# Patient Record
Sex: FEMALE | Race: BLACK OR AFRICAN AMERICAN | NOT HISPANIC OR LATINO | URBAN - NONMETROPOLITAN AREA
[De-identification: names, ages, dates, MRNs, and addresses within clinical notes are randomized per-mention and may not be internally consistent; named-entity substitution may affect disease eponyms.]

---

## 2019-02-20 ENCOUNTER — IMPORTED ENCOUNTER (OUTPATIENT)
Dept: URBAN - NONMETROPOLITAN AREA CLINIC 1 | Facility: CLINIC | Age: 39
End: 2019-02-20

## 2019-02-20 PROBLEM — H52.13: Noted: 2019-02-20

## 2019-02-20 PROBLEM — H35.373: Noted: 2019-02-20

## 2019-02-20 PROBLEM — H25.13: Noted: 2019-02-20

## 2019-02-20 PROBLEM — H52.223: Noted: 2019-02-20

## 2019-02-20 PROBLEM — E11.3293: Noted: 2018-02-13

## 2019-02-20 PROCEDURE — 92014 COMPRE OPH EXAM EST PT 1/>: CPT

## 2019-02-20 PROCEDURE — 92250 FUNDUS PHOTOGRAPHY W/I&R: CPT

## 2019-02-20 NOTE — PATIENT DISCUSSION
Type II DM dx 2013-  Discussed diagnosis in detail w/ pt today-  Exudates noted near macula OS. -  Optos done today dot blot hemes and MAs. -  OCT done previously no edema noted. -  Patient states A1C is much better at this time. -  Patient also states her kidney levels are not good either. -  Hx of kidney transplant noted. -  Monitor very closely. ERM OU-  Discussed findings in detail w/ pt today-  Signs/symptoms associated discussed-  OCT done today shows trace findings as noted. -  Monitor closely for changes. Myopia/astig-  Discussed diagnosis in detail w/ pt today-  New GLRx will be given today better than previous. -  Monitor yearly or PRN Trace Cataracts OU-  Discussed findings in detail w/ pt today-  Stable will monitor for progression.; Dr's Notes: **Pt is very impatient does not like to wait. Always speak kindly she gets her feelings hurt very easily - she told us herself! **MR  6/14/16 - n/c due to high BSDFE  2/13/18Optos 2/20/19OCT mac

## 2019-08-20 ENCOUNTER — IMPORTED ENCOUNTER (OUTPATIENT)
Dept: URBAN - NONMETROPOLITAN AREA CLINIC 1 | Facility: CLINIC | Age: 39
End: 2019-08-20

## 2019-08-20 PROBLEM — E11.3293: Noted: 2019-08-20

## 2019-08-20 PROBLEM — H52.13: Noted: 2019-08-20

## 2019-08-20 PROBLEM — H35.373: Noted: 2019-08-20

## 2019-08-20 PROBLEM — H25.13: Noted: 2019-08-20

## 2019-08-20 PROBLEM — H52.223: Noted: 2019-08-20

## 2019-08-20 PROCEDURE — S0621 ROUTINE OPHTHALMOLOGICAL EXA: HCPCS

## 2019-08-20 NOTE — PATIENT DISCUSSION
Myopia/astig-  Discussed diagnosis in detail w/ pt today-  New GLRx will be given today MR done by Angelito Cardoza. -  Monitor yearly or PRN Type II DM dx 2013-  Discussed diagnosis in detail w/ pt today-  Exudates noted near macula OS. -  Optos done previously dot blot hemes and MAs. -  OCT done previously no edema noted. -  Patient states A1C is much better at this time. -  Patient also states her kidney levels are not good either. -  Hx of kidney transplant noted. -  Monitor very closely. ERM OU-  Discussed findings in detail w/ pt today-  Signs/symptoms associated discussed-  OCT done previously shows trace findings as noted. -  Monitor closely for changes. Trace Cataracts OU-  Discussed findings in detail w/ pt today-  Stable will monitor for progression.; Dr's Notes: **Pt is very impatient does not like to wait. Always speak kindly she gets her feelings hurt very easily - she told us herself! **MR  8/20/19 DFE  2/13/18Optos 2/20/19OCT mac

## 2020-02-26 ENCOUNTER — IMPORTED ENCOUNTER (OUTPATIENT)
Dept: URBAN - NONMETROPOLITAN AREA CLINIC 1 | Facility: CLINIC | Age: 40
End: 2020-02-26

## 2020-02-26 PROBLEM — H35.373: Noted: 2020-02-26

## 2020-02-26 PROBLEM — E11.3393: Noted: 2020-02-26

## 2020-02-26 PROBLEM — H25.13: Noted: 2020-02-26

## 2020-02-26 PROCEDURE — 99213 OFFICE O/P EST LOW 20 MIN: CPT

## 2020-02-26 PROCEDURE — 92134 CPTRZ OPH DX IMG PST SGM RTA: CPT

## 2020-02-26 NOTE — PATIENT DISCUSSION
Myopia/astig-  Discussed diagnosis in detail w/ pt today-  Monitor yearly or PRN Type II DM dx 2013 Moderate NPDR OU -  Discussed diagnosis in detail w/ pt today-  Exudates noted near macula OD-  Optos done previously dot blot hemes and MAs. -  OCT done today stable OU -  Patient states A1C is much better at this time. -  Patient also states her kidney levels are not good either. -  Hx of kidney transplant noted. -  Dot blots noted OU posterior pole OU-  Continue to monitor ERM OU-  Discussed findings in detail w/ pt today-  Signs/symptoms associated discussed-  OCT done today shows ERM OU but stable from previous -  Continue to monitor Trace Cataracts OU-  Discussed findings in detail w/ pt today-  Stable will monitor for progression. Irasema OU- Discussed diagnosis in detail with patient- Discussed signs and symptoms of progression- Discussed UV protection- No treatment needed at thos time - Continue to monitor; Dr's Notes: **Pt is very impatient does not like to wait. Always speak kindly she gets her feelings hurt very easily - she told us herself! **MR  8/20/19 DFE  2/13/18Optos 2/20/19OCT mac 2/26/20

## 2020-08-27 ENCOUNTER — IMPORTED ENCOUNTER (OUTPATIENT)
Dept: URBAN - NONMETROPOLITAN AREA CLINIC 1 | Facility: CLINIC | Age: 40
End: 2020-08-27

## 2020-08-27 PROBLEM — H25.13: Noted: 2020-02-26

## 2020-08-27 PROBLEM — H35.373: Noted: 2020-08-27

## 2020-08-27 PROBLEM — E11.3393: Noted: 2020-08-27

## 2020-08-27 PROCEDURE — 92014 COMPRE OPH EXAM EST PT 1/>: CPT

## 2020-08-27 PROCEDURE — 92250 FUNDUS PHOTOGRAPHY W/I&R: CPT

## 2020-08-27 NOTE — PATIENT DISCUSSION
Myopia/astig-  Discussed diagnosis in detail w/ pt today-  DEFERS MR today -  Monitor yearly or PRN Type II DM dx 2013 Moderate NPDR OU -  Discussed diagnosis in detail w/ pt today-  Exudates noted near macula OD-  Optos done today OU shows cotton wools spots [place]  -  OCT done previously stable OU -  Patient states A1C is much better at this time. -  Patient also states her kidney levels are not good either. -  Hx of kidney transplant noted. -  Dot blots noted OU posterior pole OU-  Continue to monitor ERM OU-  Discussed findings in detail w/ pt today-  Signs/symptoms associated discussed-  OCT done previously shows ERM OU but stable from previous -  Continue to monitor [place] OU- Discussed diagnosis in detail with patient- Discussed signs and symptoms of progression- Discussed UV protection- No treatment needed at thos time - Continue to monitor; Dr's Notes: **Pt is very impatient does not like to wait. Always speak kindly she gets her feelings hurt very easily - she told us herself! **MR  8/20/19 DFE  2/13/18Optos 8/27/20OCT mac 2/26/20

## 2021-04-14 ENCOUNTER — IMPORTED ENCOUNTER (OUTPATIENT)
Dept: URBAN - NONMETROPOLITAN AREA CLINIC 1 | Facility: CLINIC | Age: 41
End: 2021-04-14

## 2021-04-14 PROBLEM — E11.3393: Noted: 2021-04-14

## 2021-04-14 PROBLEM — H35.373: Noted: 2021-04-14

## 2021-04-14 PROBLEM — H25.813: Noted: 2021-04-14

## 2021-04-14 PROCEDURE — 99213 OFFICE O/P EST LOW 20 MIN: CPT

## 2021-04-14 PROCEDURE — 92134 CPTRZ OPH DX IMG PST SGM RTA: CPT

## 2021-04-14 NOTE — PATIENT DISCUSSION
Myopia/astig-  Discussed diagnosis in detail w/ pt today-  Monitor yearly or PRN Type II DM dx 2013 Moderate NPDR OU -  Discussed diagnosis in detail w/ pt today-  Exudates noted near macula OD-  Optos done previously OU shows cotton wools spots [place]  -  OCT done previously stable OU -  Patient states A1C is much better at this time. -  Patient also states her kidney levels are not good either. -  Hx of kidney transplant noted. -  Dot blots noted OU posterior pole OU-  Continue to monitor ERM OU-  Discussed findings in detail w/ pt today-  Signs/symptoms associated discussed-  OCT done today shows ERM OU but stable from previous -  Continue to monitor Cataracts OU- Discussed diagnosis in detail with patient- Discussed signs and symptoms of progression- Discussed UV protection- No treatment needed at thos time - Continue to monitor; Dr's Notes: **Pt is very impatient does not like to wait. Always speak kindly she gets her feelings hurt very easily - she told us herself! **MR  8/20/19 DFE  2/13/18Optos 8/27/20OCT mac 4/14/21

## 2021-12-08 ENCOUNTER — IMPORTED ENCOUNTER (OUTPATIENT)
Dept: URBAN - NONMETROPOLITAN AREA CLINIC 1 | Facility: CLINIC | Age: 41
End: 2021-12-08

## 2021-12-08 PROBLEM — E11.3393: Noted: 2021-12-08

## 2021-12-08 PROBLEM — H35.373: Noted: 2021-12-08

## 2021-12-08 PROBLEM — H25.813: Noted: 2021-12-08

## 2021-12-08 PROBLEM — H52.4: Noted: 2021-12-08

## 2021-12-08 PROCEDURE — 92014 COMPRE OPH EXAM EST PT 1/>: CPT

## 2021-12-08 PROCEDURE — 92015 DETERMINE REFRACTIVE STATE: CPT

## 2021-12-08 NOTE — PATIENT DISCUSSION
Myopia/astig-  Discussed diagnosis in detail w/ pt today-  New glasses Rx given today-  Monitor yearly or PRN Type II DM dx 2013 Moderate NPDR OU -  Discussed diagnosis in detail w/ pt today-  Exudates noted near macula OD-  Optos done previously OU shows cotton wools spots [place]  -  OCT done previously stable OU -  Patient states A1C is 7.4 -  Patient also states her kidney levels are not good either. -  Hx of kidney transplant noted. -  Dot blots noted OU posterior pole OU-  Continue to monitor ERM OU-  Discussed findings in detail w/ pt today-  Signs/symptoms associated discussed-  OCT done previously shows ERM OU but stable from previous -  Continue to monitor Cataracts OU- Discussed diagnosis in detail with patient- Discussed signs and symptoms of progression- Discussed UV protection- No treatment needed at thos time - Continue to monitor; Dr's Notes: **Pt is very impatient does not like to wait. Always speak kindly she gets her feelings hurt very easily - she told us herself! **MR  8/20/19 DFE  2/13/18Optos 8/27/20OCT mac 4/14/21 no

## 2022-04-15 ASSESSMENT — TONOMETRY
OS_IOP_MMHG: 14
OD_IOP_MMHG: 12
OD_IOP_MMHG: 14
OD_IOP_MMHG: 13
OS_IOP_MMHG: 13
OS_IOP_MMHG: 15
OD_IOP_MMHG: 15
OS_IOP_MMHG: 13
OS_IOP_MMHG: 15
OD_IOP_MMHG: 12
OD_IOP_MMHG: 15
OS_IOP_MMHG: 12

## 2022-04-15 ASSESSMENT — VISUAL ACUITY
OD_CC: 20/63-
OS_SC: 20/20-
OD_PH: 20/29
OS_SC: 20/20-2
OD_SC: 20/20-1
OS_CC: 20/25
OS_SC: 20/25-
OD_SC: 20/20
OD_CC: 20/25+
OS_SC: 20/25
OD_SC: 20/30
OS_CC: 20/22-
OD_SC: 20/25

## 2022-06-15 ENCOUNTER — FOLLOW UP (OUTPATIENT)
Dept: URBAN - NONMETROPOLITAN AREA CLINIC 1 | Facility: CLINIC | Age: 42
End: 2022-06-15

## 2022-06-15 DIAGNOSIS — E11.3393: ICD-10-CM

## 2022-06-15 PROCEDURE — 99214 OFFICE O/P EST MOD 30 MIN: CPT

## 2022-06-15 PROCEDURE — 92250 FUNDUS PHOTOGRAPHY W/I&R: CPT

## 2022-06-15 ASSESSMENT — VISUAL ACUITY
OD_CC: 20/25
OS_CC: 20/25

## 2022-06-15 ASSESSMENT — TONOMETRY
OS_IOP_MMHG: 16
OD_IOP_MMHG: 16

## 2023-11-01 ENCOUNTER — ESTABLISHED PATIENT (OUTPATIENT)
Dept: URBAN - NONMETROPOLITAN AREA CLINIC 1 | Facility: CLINIC | Age: 43
End: 2023-11-01

## 2023-11-01 DIAGNOSIS — H52.4: ICD-10-CM

## 2023-11-01 PROCEDURE — S0621 ROUTINE OPHTHALMOLOGICAL EXA: HCPCS

## 2023-11-01 ASSESSMENT — VISUAL ACUITY
OD_CC: 20/30+2
OU_CC: 20/20-2
OS_CC: 20/20-1

## 2023-11-01 ASSESSMENT — TONOMETRY
OS_IOP_MMHG: 16
OD_IOP_MMHG: 16

## 2024-11-04 ENCOUNTER — COMPREHENSIVE EXAM (OUTPATIENT)
Dept: URBAN - NONMETROPOLITAN AREA CLINIC 1 | Facility: CLINIC | Age: 44
End: 2024-11-04

## 2024-11-04 DIAGNOSIS — H52.4: ICD-10-CM

## 2024-11-04 PROCEDURE — S0621AEC ROUTINE OPH EXAM INCLUDES REF/ EST PATIENT
